# Patient Record
Sex: MALE | Race: WHITE
[De-identification: names, ages, dates, MRNs, and addresses within clinical notes are randomized per-mention and may not be internally consistent; named-entity substitution may affect disease eponyms.]

---

## 2019-06-01 ENCOUNTER — HOSPITAL ENCOUNTER (EMERGENCY)
Dept: HOSPITAL 62 - ER | Age: 24
Discharge: HOME | End: 2019-06-01
Payer: COMMERCIAL

## 2019-06-01 VITALS — SYSTOLIC BLOOD PRESSURE: 135 MMHG | DIASTOLIC BLOOD PRESSURE: 87 MMHG

## 2019-06-01 DIAGNOSIS — Z23: ICD-10-CM

## 2019-06-01 DIAGNOSIS — S01.81XA: Primary | ICD-10-CM

## 2019-06-01 DIAGNOSIS — F17.200: ICD-10-CM
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
## 2019-06-01 DIAGNOSIS — F10.920: ICD-10-CM

## 2019-06-01 DIAGNOSIS — S09.90XA: ICD-10-CM

## 2019-06-01 PROCEDURE — 70450 CT HEAD/BRAIN W/O DYE: CPT

## 2019-06-01 PROCEDURE — 90471 IMMUNIZATION ADMIN: CPT

## 2019-06-01 PROCEDURE — 12016 RPR FE/E/EN/L/M 12.6-20.0 CM: CPT

## 2019-06-01 PROCEDURE — 72125 CT NECK SPINE W/O DYE: CPT

## 2019-06-01 PROCEDURE — 90715 TDAP VACCINE 7 YRS/> IM: CPT

## 2019-06-01 PROCEDURE — 99283 EMERGENCY DEPT VISIT LOW MDM: CPT

## 2019-06-01 PROCEDURE — 70486 CT MAXILLOFACIAL W/O DYE: CPT

## 2019-06-01 NOTE — ER DOCUMENT REPORT
ED General





- General


Chief Complaint: Laceration


Stated Complaint: FALL/FACIAL LACERATION


Time Seen by Provider: 06/01/19 17:19


Mode of Arrival: Wheelchair


Notes: 





Patient is a 23-year-old male without chronic medical history, arrives after 

falling down several steps while acutely intoxicated sustaining multiple 

lacerations to the left side of his face.  Uncertain whether or not he lost 

consciousness.  Describes a throbbing, aching, moderate, constant pain to the 

left side of his face and jaw where he fell.  Nothing seems improved the pain.  

No obvious worsening factor.  Denies focal weakness, numbness or confusion.  

Tetanus is not up-to-date.  Visiting from [place] with friends and has been

unable to speak to his primary doctor regarding today's concerns.





Past Medical History





- General


Information source: Patient





- Social History


Smoking Status: Current Every Day Smoker


Chew tobacco use (# tins/day): No


Frequency of alcohol use: Social


Drug Abuse: None


Lives with: Family


Family History: Reviewed & Not Pertinent


Patient has suicidal ideation: No


Patient has homicidal ideation: No


Renal/ Medical History: Denies: Hx Peritoneal Dialysis





Review of Systems





- Review of Systems


Notes: 





Constitutional: Negative for fever.


Eyes: Negative for visual changes.


ENT: Positive for facial injury


Cardiovascular: Negative for chest injury.


Respiratory: Negative for shortness of breath.


Gastrointestinal: Negative for abdominal  injury.


Genitourinary: Negative for genital injury


Musculoskeletal: Negative for back injury. 


Skin: Positive for laceration/abrasions.


Neurological: Positive for head injury.





Physical Exam





- Vital signs


Vitals: 


                                        











Temp Pulse Resp BP Pulse Ox


 


 97.9 F   76   21 H  135/87 H  97 


 


 06/01/19 19:51  06/01/19 19:51  06/01/19 19:51  06/01/19 19:51  06/01/19 19:51











Interpretation: Normal


Notes: 





PHYSICAL EXAMINATION:





GENERAL: Well-appearing, no acute distress.





HEAD: Atraumatic, normocephalic.





EYES: Pupils equal round and reactive to light, extraocular movements intact, 

sclera anicteric, conjunctiva are normal.





ENT: nares patent, no oral pharyngeal trauma.  No hemotympanum, no Dotson's 

sign, no raccoon eyes.





NECK: No midline cervical spine tenderness.  Patient able to move their head to 

45 bilaterally without any discomfort. 





LUNGS: Breath sounds clear to auscultation bilaterally and equal.  No wheezes 

rales or rhonchi.





HEART: Regular rate and rhythm without murmurs.





CHEST WALL: No ecchymosis over the chest wall.





ABDOMEN: Soft, nontender, normoactive bowel sounds.  No guarding, no rebound.  

No abdominal bruising





EXTREMITIES: Normal range of motion, no pitting or edema.  No long bone 

deformities.





BACK: No midline spinal tenderness, step-offs, or deformities.





NEUROLOGICAL: Face symmetric.  Tongue protrudes midline.  Extraocular motions 

intact.  Pupils are 2 mm and equally reactive.  Normal speech, normal gait.  5 

out of 5 strength in both the distal and proximal upper and lower extremities 

bilaterally.  Sensation is grossly intact throughout.  Finger to nose testing 

normal.  Pronator drift normal.





PSYCH: Somewhat intoxicated but in no acute distress





SKIN: Warm, Dry, normal turgor, positive for lacerations to the left face and 

ear.  Please review documentation from laceration repair notes





Course





- Re-evaluation


Re-evalutation: 





06/01/19 19:36


Patient presents after a fall down several steps while intoxicated.  Arrives 

with multiple lacerations over the left ear and left cheek.  See laceration 

repair documentation.  Tetanus was updated upon arrival.  Given patient's 

intoxicated status and associated head trauma CT of the head, cervical spine and

facial areas were all completed without evidence of underlying fracture or 

retained foreign body.  The patient did not sustain any injuries to any other 

location.  Has no focal neurologic deficits on exam.  Like alert, oriented al

though intoxicated.  TXA was required for injection into the largest 7 cm cheek 

laceration as there was an arterial bleed that would not stop with direct 

pressure.  This did effectively stop the bleeding and the wound was subsequently

closed after aggressive irrigation.  Patient has been advised against heavy 

drinking in the future.  At this time will discharge with return precautions and

follow-up recommendations.  Verbal discharge instructions given a the bedside 

and opportunity for questions given. Medication warnings reviewed. Patient is in

agreement with this plan and has verbalized understanding of return precautions 

and the need for primary care follow-up in the next 24-72 hours.





- Vital Signs


Vital signs: 


                                        











Temp Pulse Resp BP Pulse Ox


 


 97.9 F   76   21 H  135/87 H  97 


 


 06/01/19 19:51  06/01/19 19:51  06/01/19 19:51  06/01/19 19:51  06/01/19 19:51














- Diagnostic Test


Radiology reviewed: Image reviewed, Reports reviewed


Radiology results interpreted by me: 





06/01/19 19:38


CT head: No acute intracranial bleed or mass 





Procedures





- Laceration/Wound Repair


  ** Left ear


Wound length (cm): 2


Wound's Depth, Shape: Superficial


Laceration pre-procedure: Sterile PPE donned


Anesthetic type: 1% Lidocaine w/epi


Volume Anesthetic (mLs): 1


Wound explored: Clean


Irrigated w/ Saline (mLs): 300


Wound Debrided: Minimal


Wound Repaired With: Sutures


Suture Size/Type: 5:0, Prolene


Number of Sutures: 1 - Continuous running stitch


Layer Closure?: No


Post-procedure wound care: Sterile dressing applied


Post-procedure NV exam normal: Yes


Complications: No





  ** Left cheek 1


Wound length (cm): 7


Wound's Depth, Shape: Irregular, Contused tissue


Laceration pre-procedure: Sterile PPE donned


Anesthetic type: 1% Lidocaine w/epi


Volume Anesthetic (mLs): 3


Wound explored: Clean


Irrigated w/ Saline (mLs): 500


Wound Debrided: Moderate


Wound Repaired With: Sutures


Suture Size/Type: 5:0, 4:0, Prolene


Number of Sutures: 5 - 2 horizontal mattress, 3 simple interrupted


Layer Closure?: No


Post-procedure wound care: Sterile dressing applied


Post-procedure NV exam normal: Yes


Complications: No





  ** Left cheek 2


Wound length (cm): 7


Wound's Depth, Shape: Superficial


Laceration pre-procedure: Sterile PPE donned


Anesthetic type: 1% Lidocaine w/epi


Volume Anesthetic (mLs): 1


Wound explored: Clean


Irrigated w/ Saline (mLs): 500


Wound Debrided: Minimal


Wound Repaired With: Sutures


Suture Size/Type: 5:0, Prolene


Number of Sutures: 4


Layer Closure?: No


Post-procedure wound care: Sterile dressing applied


Post-procedure NV exam normal: Yes


Complications: No





  ** Left cheek 3


Wound length (cm): 2


Wound's Depth, Shape: Superficial


Laceration pre-procedure: Sterile PPE donned


Anesthetic type: 1% Lidocaine w/epi


Volume Anesthetic (mLs): 300


Wound explored: Clean


Wound Debrided: Minimal


Wound Repaired With: Sutures


Suture Size/Type: 5:0, Prolene


Number of Sutures: 3


Layer Closure?: No


Post-procedure wound care: Sterile dressing applied


Post-procedure NV exam normal: Yes


Complications: No





  ** Left cheek 4


Wound length (cm): 1


Wound's Depth, Shape: Superficial


Laceration pre-procedure: Sterile PPE donned


Anesthetic type: 1% Lidocaine w/epi


Volume Anesthetic (mLs): 1


Wound explored: Clean


Irrigated w/ Saline (mLs): 300


Wound Debrided: Minimal


Wound Repaired With: Sutures


Suture Size/Type: 5:0, Prolene


Number of Sutures: 1


Layer Closure?: No


Post-procedure wound care: Sterile dressing applied


Post-procedure NV exam normal: Yes


Complications: No





Discharge





- Discharge


Clinical Impression: 


 Laceration of multiple sites of face





Head trauma


Qualifiers:


 Encounter type: initial encounter Qualified Code(s): S09.90XA - Unspecified i

njury of head, initial encounter





Alcohol intoxication


Qualifiers:


 Complication of substance-induced condition: uncomplicated Qualified Code(s): 

F10.920 - Alcohol use, unspecified with intoxication, uncomplicated





Condition: Good


Disposition: HOME, SELF-CARE


Additional Instructions: 


Please return to your primary doctor, the ED, or an urgent care in 7 days for 

suture removal. Return immediately if you develop spreading redness around the 

wound, pus from the wound, worsening pain, or a fever of >100.4. Keep the area 

clean and dry. Wash gently with soap and water twice daily and cover with 

antibiotic ointment.  For your pain: Take ibuprofen 600 mg and acetaminophen 

1000 mg every 6 hours together as needed for pain. 





You have likely sustained a contusion (bruise) to your head.  Your CT scans 

today are normal. Symptoms to expect from a concussion include nausea, mild to 

moderate headache, difficulty concentrating or sleeping, and mild 

lightheadedness.  These symptoms should improve over the next few days to weeks.

 Return to the emergency department or follow-up with your primary care doctor 

if your symptoms are not improving over this time.  Signs of a more serious head

injury include vomiting, severe headache, excessive sleepiness or confusion, and

weakness or numbness in your face, arms or legs.  Return immediately to the 

Emergency Department if you experience any of these more concerning symptoms.  R

est, avoid strenuous physical or mental activity, and avoid activities that 

could potentially result in another head injury until all your symptoms from 

this head injury are completely resolved for at least 2-3 weeks.  If you 

participate in sports, get cleared by your doctor or  before returning to

play.  You may take ibuprofen or acetaminophen over the counter according to 

label instructions for mild headache or scalp soreness.





You were seen in the emergency department today for being drunk and having 

associated fall.  Being seen in the emergency department after drinking alcohol 

is a serious indicator that you may have a problem with alcohol.  Please very 

seriously consider modifying your drinking behaviors.

## 2019-06-01 NOTE — RADIOLOGY REPORT (SQ)
EXAM DESCRIPTION:  CT CERVICAL SPINE WITHOUT



COMPLETED DATE/TIME:  6/1/2019 5:49 pm



REASON FOR STUDY:  fall down stairs, facial lac



COMPARISON:  Concurrent head and maxillofacial CTs



TECHNIQUE:  Axial images acquired through the cervical spine without intravenous contrast.  Images re
viewed with lung, soft tissue and bone windows.  Reconstructed coronal and sagittal MPR images review
ed.  Images stored on PACS.

All CT scanners at this facility use dose modulation, iterative reconstruction, and/or weight based d
osing when appropriate to reduce radiation dose to as low as reasonably achievable (ALARA).

[facility]: Dose Right  [facility]: CareDose    [facility]: Dose Right    [facility]: Teradose 4D    [facility]: Smart Technologies



RADIATION DOSE:  CT Rad equipment meets quality standard of care and radiation dose reduction techniq
ues were employed. CTDIvol: 16.2 mGy. DLP: 372 mGy-cm. mGy.



LIMITATIONS:  None.



FINDINGS:  ALIGNMENT: Anatomic.

MINERALIZATION: Normal.

VERTEBRAL BODIES: No fractures or dislocation.

DISCS: No significant disc disease.

FACETS, LATERAL MASSES, POSTERIOR ELEMENTS: No fractures.  No dislocation.  No acute findings.

HARDWARE: None in the spine.

VISUALIZED RIBS: No fractures.

LUNG APICES AND SOFT TISSUES: No significant or acute findings.

OTHER: No other significant finding.



IMPRESSION:  NO ACUTE OR SIGNIFICANT FINDINGS IN THE CERVICAL SPINE.



TECHNICAL DOCUMENTATION:  JOB ID:  [id number]

Quality ID # 436: Final reports with documentation of one or more dose reduction techniques (e.g., Au
tomated exposure control, adjustment of the mA and/or kV according to patient size, use of iterative 
reconstruction technique)

 2011 [employer]- All Rights Reserved



Reading location - IP/workstation name: [email or web address]

## 2019-06-01 NOTE — ER DOCUMENT REPORT
ED Medical Screen (RME)





- General


Chief Complaint: Laceration


Stated Complaint: FALL/FACIAL LACERATION


Time Seen by Provider: 06/01/19 17:19


Mode of Arrival: Wheelchair


Information source: Patient


Notes: 





Patient presents after falling down 12 stairs injuring his face.  Patient with a

large left facial laceration with arterial bleeding.  Patient states that he is 

uncertain if he would may have gotten into the wound.  Patient denies any loss 

of consciousness nausea or vomiting.  Patient does admit to drinking 12 shots 

today.





I have greeted and performed a rapid initial assessment of this patient.  A 

comprehensive ED assessment and evaluation of the patient, analysis of test 

results and completion of the medical decision making process will be conducted 

by additional ED providers.





Physical Exam





- Skin


Skin irregularity: Laceration - Arterial bleeding laceration to left side of 

face

## 2019-06-01 NOTE — RADIOLOGY REPORT (SQ)
EXAM DESCRIPTION:  CT HEAD WITHOUT



COMPLETED DATE/TIME:  6/1/2019 5:49 pm



REASON FOR STUDY:  fall down stairs, facial lac



COMPARISON:  Concurrent CT cervical spine and maxillofacial CT



TECHNIQUE:  Axial images acquired through the brain without intravenous contrast.  Images reviewed wi
th bone, brain and subdural windows.  Additional sagittal and coronal reconstructions were generated.
 Images stored on PACS.

All CT scanners at this facility use dose modulation, iterative reconstruction, and/or weight based d
osing when appropriate to reduce radiation dose to as low as reasonably achievable (ALARA).

[facility]: Dose Right  [facility]: CareDose    [facility]: Dose Right    [facility]: Teradose 4D    [facility]: Smart [employer]



RADIATION DOSE:  CT Rad equipment meets quality standard of care and radiation dose reduction techniq
ues were employed. CTDIvol: 53.2 mGy. DLP: 1044 mGy-cm. mGy.



LIMITATIONS:  None.



FINDINGS:  VENTRICLES: Normal size and contour.

CEREBRUM: No masses.  No hemorrhage.  No midline shift.  No evidence for acute infarction. Normal gra
y/white matter differentiation. No areas of low density in the white matter.

CEREBELLUM: No masses.  No hemorrhage.  No alteration of density.  No evidence for acute infarction.

EXTRAAXIAL SPACES: No fluid collections.  No masses.

ORBITS AND GLOBE: No intra- or extraconal masses.  Normal contour of globe without masses.

CALVARIUM: No fracture.

PARANASAL SINUSES: Mild mucoperiosteal thickening in the visualized paranasal sinuses.

SOFT TISSUES: No mass or hematoma.

OTHER: No other significant finding.



IMPRESSION:  No acute intracranial hemorrhage.

EVIDENCE OF ACUTE STROKE: NO.



COMMENT:  Quality ID # 436: Final reports with documentation of one or more dose reduction techniques
 (e.g., Automated exposure control, adjustment of the mA and/or kV according to patient size, use of 
iterative reconstruction technique)



TECHNICAL DOCUMENTATION:  JOB ID:  [id number]

 2011 [employer]- All Rights Reserved



Reading location - IP/workstation name: [email or web address]